# Patient Record
Sex: FEMALE | Race: WHITE | Employment: FULL TIME | URBAN - METROPOLITAN AREA
[De-identification: names, ages, dates, MRNs, and addresses within clinical notes are randomized per-mention and may not be internally consistent; named-entity substitution may affect disease eponyms.]

---

## 2020-11-16 ENCOUNTER — TRANSCRIBE ORDER (OUTPATIENT)
Dept: SCHEDULING | Age: 30
End: 2020-11-16

## 2020-11-16 DIAGNOSIS — S96.912D TEAR OF TENDON OF LEFT FOOT, SUBSEQUENT ENCOUNTER: Primary | ICD-10-CM

## 2020-11-27 ENCOUNTER — HOSPITAL ENCOUNTER (OUTPATIENT)
Dept: MRI IMAGING | Age: 30
Discharge: HOME | End: 2020-11-27
Attending: PODIATRIST | Admitting: PODIATRIST
Payer: COMMERCIAL

## 2020-11-27 DIAGNOSIS — S96.912D TEAR OF TENDON OF LEFT FOOT, SUBSEQUENT ENCOUNTER: ICD-10-CM

## 2020-11-27 PROCEDURE — 73718 MRI LOWER EXTREMITY W/O DYE: CPT

## 2020-12-17 NOTE — PERIOP NOTES
PATIENT REQUESTED TO DO COVID TESTING ON 12/30/2020  AT 0730 DUE TO VACATION, PT IS AWARE AND AGREED ON  QUARATINING AN EXTRA DAY. PATIENT INSTRUCTED ON SELF QUARANTINE BETWEEN TESTING AND ARRIVAL TIME DAY OF SURGERY.

## 2020-12-18 ENCOUNTER — TRANSCRIBE ORDER (OUTPATIENT)
Dept: REGISTRATION | Age: 30
End: 2020-12-18

## 2020-12-18 DIAGNOSIS — Z01.812 PRE-PROCEDURE LAB EXAM: Primary | ICD-10-CM

## 2021-01-08 ENCOUNTER — HOSPITAL ENCOUNTER (OUTPATIENT)
Dept: PREADMISSION TESTING | Age: 31
Discharge: HOME | End: 2021-01-08
Payer: COMMERCIAL

## 2021-01-08 ENCOUNTER — TRANSCRIBE ORDER (OUTPATIENT)
Dept: REGISTRATION | Age: 31
End: 2021-01-08

## 2021-01-08 DIAGNOSIS — Z01.812 PRE-PROCEDURE LAB EXAM: ICD-10-CM

## 2021-01-08 DIAGNOSIS — Z01.812 PRE-PROCEDURE LAB EXAM: Primary | ICD-10-CM

## 2021-01-08 PROCEDURE — 87635 SARS-COV-2 COVID-19 AMP PRB: CPT

## 2021-01-09 LAB — SARS-COV-2, COV2NT: NOT DETECTED

## 2021-01-11 ENCOUNTER — ANESTHESIA EVENT (OUTPATIENT)
Dept: SURGERY | Age: 31
End: 2021-01-11
Payer: COMMERCIAL

## 2021-01-12 ENCOUNTER — ANESTHESIA (OUTPATIENT)
Dept: SURGERY | Age: 31
End: 2021-01-12
Payer: COMMERCIAL

## 2021-01-12 ENCOUNTER — HOSPITAL ENCOUNTER (OUTPATIENT)
Age: 31
Setting detail: OUTPATIENT SURGERY
Discharge: HOME | End: 2021-01-12
Attending: PODIATRIST | Admitting: PODIATRIST
Payer: COMMERCIAL

## 2021-01-12 VITALS
WEIGHT: 149 LBS | DIASTOLIC BLOOD PRESSURE: 82 MMHG | HEART RATE: 93 BPM | RESPIRATION RATE: 8 BRPM | HEIGHT: 67 IN | BODY MASS INDEX: 23.39 KG/M2 | SYSTOLIC BLOOD PRESSURE: 123 MMHG | OXYGEN SATURATION: 95 % | TEMPERATURE: 97.6 F

## 2021-01-12 DIAGNOSIS — G89.18 POST-OP PAIN: Primary | ICD-10-CM

## 2021-01-12 LAB — HCG UR QL: NEGATIVE

## 2021-01-12 PROCEDURE — 88304 TISSUE EXAM BY PATHOLOGIST: CPT

## 2021-01-12 PROCEDURE — 74011000250 HC RX REV CODE- 250: Performed by: NURSE ANESTHETIST, CERTIFIED REGISTERED

## 2021-01-12 PROCEDURE — 74011250636 HC RX REV CODE- 250/636: Performed by: NURSE ANESTHETIST, CERTIFIED REGISTERED

## 2021-01-12 PROCEDURE — 77030036687 HC SHOE PSTOP S2SG -A

## 2021-01-12 PROCEDURE — 74011250636 HC RX REV CODE- 250/636: Performed by: PODIATRIST

## 2021-01-12 PROCEDURE — 76060000034 HC ANESTHESIA 1.5 TO 2 HR: Performed by: PODIATRIST

## 2021-01-12 PROCEDURE — 76210000016 HC OR PH I REC 1 TO 1.5 HR: Performed by: PODIATRIST

## 2021-01-12 PROCEDURE — 74011000250 HC RX REV CODE- 250: Performed by: PODIATRIST

## 2021-01-12 PROCEDURE — 77030002933 HC SUT MCRYL J&J -A: Performed by: PODIATRIST

## 2021-01-12 PROCEDURE — 77030031139 HC SUT VCRL2 J&J -A: Performed by: PODIATRIST

## 2021-01-12 PROCEDURE — 77030040922 HC BLNKT HYPOTHRM STRY -A

## 2021-01-12 PROCEDURE — 81025 URINE PREGNANCY TEST: CPT

## 2021-01-12 PROCEDURE — 74011250636 HC RX REV CODE- 250/636: Performed by: ANESTHESIOLOGY

## 2021-01-12 PROCEDURE — 77030010509 HC AIRWY LMA MSK TELE -A: Performed by: ANESTHESIOLOGY

## 2021-01-12 PROCEDURE — 74011250636 HC RX REV CODE- 250/636

## 2021-01-12 PROCEDURE — 2709999900 HC NON-CHARGEABLE SUPPLY: Performed by: PODIATRIST

## 2021-01-12 PROCEDURE — 76210000020 HC REC RM PH II FIRST 0.5 HR: Performed by: PODIATRIST

## 2021-01-12 PROCEDURE — 77030002916 HC SUT ETHLN J&J -A: Performed by: PODIATRIST

## 2021-01-12 PROCEDURE — 76010000149 HC OR TIME 1 TO 1.5 HR: Performed by: PODIATRIST

## 2021-01-12 PROCEDURE — 74011250637 HC RX REV CODE- 250/637: Performed by: ANESTHESIOLOGY

## 2021-01-12 RX ORDER — SODIUM CHLORIDE 0.9 % (FLUSH) 0.9 %
5-40 SYRINGE (ML) INJECTION EVERY 8 HOURS
Status: DISCONTINUED | OUTPATIENT
Start: 2021-01-12 | End: 2021-01-12 | Stop reason: HOSPADM

## 2021-01-12 RX ORDER — DEXTROSE, SODIUM CHLORIDE, SODIUM LACTATE, POTASSIUM CHLORIDE, AND CALCIUM CHLORIDE 5; .6; .31; .03; .02 G/100ML; G/100ML; G/100ML; G/100ML; G/100ML
125 INJECTION, SOLUTION INTRAVENOUS CONTINUOUS
Status: DISCONTINUED | OUTPATIENT
Start: 2021-01-12 | End: 2021-01-12 | Stop reason: HOSPADM

## 2021-01-12 RX ORDER — PROPOFOL 10 MG/ML
INJECTION, EMULSION INTRAVENOUS AS NEEDED
Status: DISCONTINUED | OUTPATIENT
Start: 2021-01-12 | End: 2021-01-12 | Stop reason: HOSPADM

## 2021-01-12 RX ORDER — DEXAMETHASONE SODIUM PHOSPHATE 4 MG/ML
INJECTION, SOLUTION INTRA-ARTICULAR; INTRALESIONAL; INTRAMUSCULAR; INTRAVENOUS; SOFT TISSUE AS NEEDED
Status: DISCONTINUED | OUTPATIENT
Start: 2021-01-12 | End: 2021-01-12 | Stop reason: HOSPADM

## 2021-01-12 RX ORDER — MORPHINE SULFATE 10 MG/ML
2 INJECTION, SOLUTION INTRAMUSCULAR; INTRAVENOUS
Status: DISCONTINUED | OUTPATIENT
Start: 2021-01-12 | End: 2021-01-12 | Stop reason: HOSPADM

## 2021-01-12 RX ORDER — KETOROLAC TROMETHAMINE 30 MG/ML
INJECTION, SOLUTION INTRAMUSCULAR; INTRAVENOUS AS NEEDED
Status: DISCONTINUED | OUTPATIENT
Start: 2021-01-12 | End: 2021-01-12 | Stop reason: HOSPADM

## 2021-01-12 RX ORDER — DEXAMETHASONE SODIUM PHOSPHATE 4 MG/ML
4 INJECTION, SOLUTION INTRA-ARTICULAR; INTRALESIONAL; INTRAMUSCULAR; INTRAVENOUS; SOFT TISSUE ONCE
Status: COMPLETED | OUTPATIENT
Start: 2021-01-12 | End: 2021-01-12

## 2021-01-12 RX ORDER — SODIUM CHLORIDE, SODIUM LACTATE, POTASSIUM CHLORIDE, CALCIUM CHLORIDE 600; 310; 30; 20 MG/100ML; MG/100ML; MG/100ML; MG/100ML
125 INJECTION, SOLUTION INTRAVENOUS CONTINUOUS
Status: DISCONTINUED | OUTPATIENT
Start: 2021-01-12 | End: 2021-01-12 | Stop reason: HOSPADM

## 2021-01-12 RX ORDER — ONDANSETRON 2 MG/ML
INJECTION INTRAMUSCULAR; INTRAVENOUS
Status: COMPLETED
Start: 2021-01-12 | End: 2021-01-12

## 2021-01-12 RX ORDER — MIDAZOLAM HYDROCHLORIDE 1 MG/ML
1 INJECTION, SOLUTION INTRAMUSCULAR; INTRAVENOUS AS NEEDED
Status: DISCONTINUED | OUTPATIENT
Start: 2021-01-12 | End: 2021-01-12 | Stop reason: HOSPADM

## 2021-01-12 RX ORDER — FENTANYL CITRATE 50 UG/ML
50 INJECTION, SOLUTION INTRAMUSCULAR; INTRAVENOUS AS NEEDED
Status: DISCONTINUED | OUTPATIENT
Start: 2021-01-12 | End: 2021-01-12 | Stop reason: HOSPADM

## 2021-01-12 RX ORDER — ONDANSETRON 2 MG/ML
INJECTION INTRAMUSCULAR; INTRAVENOUS AS NEEDED
Status: DISCONTINUED | OUTPATIENT
Start: 2021-01-12 | End: 2021-01-12 | Stop reason: HOSPADM

## 2021-01-12 RX ORDER — FENTANYL CITRATE 50 UG/ML
INJECTION, SOLUTION INTRAMUSCULAR; INTRAVENOUS AS NEEDED
Status: DISCONTINUED | OUTPATIENT
Start: 2021-01-12 | End: 2021-01-12 | Stop reason: HOSPADM

## 2021-01-12 RX ORDER — EPHEDRINE SULFATE/0.9% NACL/PF 50 MG/5 ML
SYRINGE (ML) INTRAVENOUS AS NEEDED
Status: DISCONTINUED | OUTPATIENT
Start: 2021-01-12 | End: 2021-01-12 | Stop reason: HOSPADM

## 2021-01-12 RX ORDER — SODIUM CHLORIDE 0.9 % (FLUSH) 0.9 %
5-40 SYRINGE (ML) INJECTION AS NEEDED
Status: DISCONTINUED | OUTPATIENT
Start: 2021-01-12 | End: 2021-01-12 | Stop reason: HOSPADM

## 2021-01-12 RX ORDER — DEXMEDETOMIDINE HYDROCHLORIDE 100 UG/ML
INJECTION, SOLUTION INTRAVENOUS AS NEEDED
Status: DISCONTINUED | OUTPATIENT
Start: 2021-01-12 | End: 2021-01-12 | Stop reason: HOSPADM

## 2021-01-12 RX ORDER — PROMETHAZINE HYDROCHLORIDE 25 MG/1
25 TABLET ORAL
Qty: 25 TAB | Refills: 0 | Status: SHIPPED | OUTPATIENT
Start: 2021-01-12 | End: 2021-10-12

## 2021-01-12 RX ORDER — LIDOCAINE HYDROCHLORIDE 20 MG/ML
INJECTION, SOLUTION EPIDURAL; INFILTRATION; INTRACAUDAL; PERINEURAL AS NEEDED
Status: DISCONTINUED | OUTPATIENT
Start: 2021-01-12 | End: 2021-01-12 | Stop reason: HOSPADM

## 2021-01-12 RX ORDER — ACETAMINOPHEN 325 MG/1
650 TABLET ORAL ONCE
Status: COMPLETED | OUTPATIENT
Start: 2021-01-12 | End: 2021-01-12

## 2021-01-12 RX ORDER — OXYCODONE HYDROCHLORIDE 5 MG/1
5 TABLET ORAL AS NEEDED
Status: DISCONTINUED | OUTPATIENT
Start: 2021-01-12 | End: 2021-01-12 | Stop reason: HOSPADM

## 2021-01-12 RX ORDER — OXYCODONE AND ACETAMINOPHEN 7.5; 325 MG/1; MG/1
1 TABLET ORAL
Qty: 15 TAB | Refills: 0 | Status: SHIPPED | OUTPATIENT
Start: 2021-01-12 | End: 2021-01-15

## 2021-01-12 RX ORDER — DIPHENHYDRAMINE HYDROCHLORIDE 50 MG/ML
12.5 INJECTION, SOLUTION INTRAMUSCULAR; INTRAVENOUS AS NEEDED
Status: DISCONTINUED | OUTPATIENT
Start: 2021-01-12 | End: 2021-01-12 | Stop reason: HOSPADM

## 2021-01-12 RX ORDER — DEXAMETHASONE SODIUM PHOSPHATE 4 MG/ML
INJECTION, SOLUTION INTRA-ARTICULAR; INTRALESIONAL; INTRAMUSCULAR; INTRAVENOUS; SOFT TISSUE
Status: DISCONTINUED
Start: 2021-01-12 | End: 2021-01-12 | Stop reason: HOSPADM

## 2021-01-12 RX ORDER — MIDAZOLAM HYDROCHLORIDE 1 MG/ML
1 INJECTION, SOLUTION INTRAMUSCULAR; INTRAVENOUS
Status: DISCONTINUED | OUTPATIENT
Start: 2021-01-12 | End: 2021-01-12 | Stop reason: HOSPADM

## 2021-01-12 RX ORDER — FENTANYL CITRATE 50 UG/ML
25 INJECTION, SOLUTION INTRAMUSCULAR; INTRAVENOUS
Status: DISCONTINUED | OUTPATIENT
Start: 2021-01-12 | End: 2021-01-12 | Stop reason: HOSPADM

## 2021-01-12 RX ORDER — MIDAZOLAM HYDROCHLORIDE 1 MG/ML
INJECTION, SOLUTION INTRAMUSCULAR; INTRAVENOUS AS NEEDED
Status: DISCONTINUED | OUTPATIENT
Start: 2021-01-12 | End: 2021-01-12 | Stop reason: HOSPADM

## 2021-01-12 RX ORDER — BUPIVACAINE HYDROCHLORIDE 5 MG/ML
INJECTION, SOLUTION EPIDURAL; INTRACAUDAL AS NEEDED
Status: DISCONTINUED | OUTPATIENT
Start: 2021-01-12 | End: 2021-01-12 | Stop reason: HOSPADM

## 2021-01-12 RX ORDER — ONDANSETRON 2 MG/ML
4 INJECTION INTRAMUSCULAR; INTRAVENOUS AS NEEDED
Status: DISCONTINUED | OUTPATIENT
Start: 2021-01-12 | End: 2021-01-12 | Stop reason: HOSPADM

## 2021-01-12 RX ORDER — LIDOCAINE HYDROCHLORIDE 10 MG/ML
0.5 INJECTION, SOLUTION EPIDURAL; INFILTRATION; INTRACAUDAL; PERINEURAL AS NEEDED
Status: DISCONTINUED | OUTPATIENT
Start: 2021-01-12 | End: 2021-01-12 | Stop reason: HOSPADM

## 2021-01-12 RX ADMIN — SODIUM CHLORIDE, POTASSIUM CHLORIDE, SODIUM LACTATE AND CALCIUM CHLORIDE 125 ML/HR: 600; 310; 30; 20 INJECTION, SOLUTION INTRAVENOUS at 10:59

## 2021-01-12 RX ADMIN — DEXMEDETOMIDINE HYDROCHLORIDE 10 MCG: 100 INJECTION, SOLUTION, CONCENTRATE INTRAVENOUS at 14:52

## 2021-01-12 RX ADMIN — Medication 10 MG: at 15:20

## 2021-01-12 RX ADMIN — PROPOFOL 170 MG: 10 INJECTION, EMULSION INTRAVENOUS at 14:47

## 2021-01-12 RX ADMIN — MIDAZOLAM 2 MG: 1 INJECTION INTRAMUSCULAR; INTRAVENOUS at 14:38

## 2021-01-12 RX ADMIN — SODIUM CHLORIDE, POTASSIUM CHLORIDE, SODIUM LACTATE AND CALCIUM CHLORIDE: 600; 310; 30; 20 INJECTION, SOLUTION INTRAVENOUS at 15:45

## 2021-01-12 RX ADMIN — LIDOCAINE HYDROCHLORIDE 50 MG: 20 INJECTION, SOLUTION EPIDURAL; INFILTRATION; INTRACAUDAL; PERINEURAL at 14:47

## 2021-01-12 RX ADMIN — DEXMEDETOMIDINE HYDROCHLORIDE 10 MCG: 100 INJECTION, SOLUTION, CONCENTRATE INTRAVENOUS at 16:01

## 2021-01-12 RX ADMIN — KETOROLAC TROMETHAMINE 30 MG: 30 INJECTION, SOLUTION INTRAMUSCULAR; INTRAVENOUS at 15:51

## 2021-01-12 RX ADMIN — ACETAMINOPHEN 650 MG: 325 TABLET ORAL at 10:50

## 2021-01-12 RX ADMIN — PROPOFOL 30 MG: 10 INJECTION, EMULSION INTRAVENOUS at 14:37

## 2021-01-12 RX ADMIN — WATER 2 G: 1 INJECTION INTRAMUSCULAR; INTRAVENOUS; SUBCUTANEOUS at 14:57

## 2021-01-12 RX ADMIN — DEXAMETHASONE SODIUM PHOSPHATE 8 MG: 4 INJECTION, SOLUTION INTRAMUSCULAR; INTRAVENOUS at 15:15

## 2021-01-12 RX ADMIN — ONDANSETRON 4 MG: 2 INJECTION INTRAMUSCULAR; INTRAVENOUS at 16:28

## 2021-01-12 RX ADMIN — FENTANYL CITRATE 100 MCG: 50 INJECTION, SOLUTION INTRAMUSCULAR; INTRAVENOUS at 14:47

## 2021-01-12 RX ADMIN — DEXAMETHASONE SODIUM PHOSPHATE 4 MG: 4 INJECTION, SOLUTION INTRAMUSCULAR; INTRAVENOUS at 16:47

## 2021-01-12 RX ADMIN — ONDANSETRON HYDROCHLORIDE 4 MG: 2 INJECTION, SOLUTION INTRAMUSCULAR; INTRAVENOUS at 15:29

## 2021-01-12 NOTE — BRIEF OP NOTE
Brief Postoperative Note    Patient: Sandee Curran  YOB: 1990  MRN: [id number]    Date of Procedure: 1/12/2021     Pre-Op Diagnosis: NEUROMA INTERSPACE LEFT FOOT    Post-Op Diagnosis: Cyst interspace 1 left foot    Procedure(s):  Remove cyst INTERSPACE 1 LEFT FOOT    Surgeon(s):  INDIO Hutson DPM Betsy Degree, MD    Surgical Assistant: None    Anesthesia: MAC     Estimated Blood Loss (mL): Minimal    Complications: None    Specimens:   ID Type Source Tests Collected by Time Destination   1 : soft tissue cyst Fresh Foot, left  [facility] 1/12/2021 1533 Pathology        Implants: * No implants in log *    Drains: * No LDAs found *    Findings: cyst    Electronically Signed by Wilburn Severin, DPM on 1/12/2021 at 3:45 PM

## 2021-01-12 NOTE — ROUTINE PROCESS
Patient: Esther Mohamud MRN: [id number]  SSN: [id number] YOB: 1990  Age: 27 y.o. Sex: female Patient is status post Procedure(s): RELEASE/RESECT NERVE INTERSPACE 1 LEFT FOOT. Surgeon(s) and Role: * Leonard Jo DPM - Primary Praveen , MD - Resident - Assisting Joellen Hanson DPM 
 
Local/Dose/Irrigation:  18 cc 0.5% marcaine, 4 mg dexamethasone Peripheral IV 01/12/21 Anterior;Right Hand (Active) Site Assessment Clean, dry, & intact 01/12/21 1059 Phlebitis Assessment 0 01/12/21 1059 Infiltration Assessment 0 01/12/21 1059 Dressing Status Clean, dry, & intact 01/12/21 1059 Dressing Type Transparent;Tape 01/12/21 1059 Hub Color/Line Status Pink; Infusing 01/12/21 1059 Airway - Endotracheal Tube 01/12/21 (Active) Dressing/Packing:  Incision 01/12/21 Foot Left-Dressing/Treatment: Ace wrap;Dry dressing;Gauze dressing/dressing sponge;Xeroform; Other (Comment)(kerlix) (01/12/21 1551) Splint/Cast:  ] Other:  Voided immediately prior to bringing back to OR

## 2021-01-12 NOTE — ANESTHESIA POSTPROCEDURE EVALUATION
Procedure(s):  RELEASE/RESECT NERVE INTERSPACE 1 LEFT FOOT. general    Anesthesia Post Evaluation        Patient location during evaluation: PACU  Patient participation: complete - patient participated  Level of consciousness: awake and alert  Pain management: adequate  Airway patency: patent  Anesthetic complications: no  Cardiovascular status: acceptable  Respiratory status: acceptable  Hydration status: acceptable  Comments: I have seen and evaluated the patient and is ready for discharge. Sanya Soto MD    Post anesthesia nausea and vomiting:  none      INITIAL Post-op Vital signs:   Vitals Value Taken Time   BP     Temp     Pulse 126 01/12/21 1616   Resp 13 01/12/21 1616   SpO2 98 % 01/12/21 1616   Vitals shown include unvalidated device data.

## 2021-01-12 NOTE — DISCHARGE INSTRUCTIONS
INSTRUCTIONS FOLLOWING FOOT SURGERY    ACTIVITY:  · Elevate feet for 48 hours  · Use ice as directed by your doctor  · Use crutches / walker as directed by your doctor, and follow your doctors instructions as to your weight bearing status - you can bear weight    DIET:  · Clear liquids until no nausea or vomiting; then light diet for the first day  · An advance to regular diet on second day, unless your doctor orders otherwise. PAIN:  · Take pain medication as directed by your doctor. · Call your doctor if pain is not relieved by medication. · Do not take aspirin or blood thinners until directed by your doctor. DRESSING CARE: keep dressing clean and dry, do not remove       FOLLOW-UP PHONE CALLS:  · Calls will be made by nursing staff. · If you had any problems, call your doctor as needed. CALL YOUR DOCTOR IF:  · Excessive bleeding that does not stop after holding mild pressure over the area  · Temperature of 101° F or above  · Redness, excessive swelling or bruising, and/or green or yellow, smelly discharge from incision  · Loss of sensation -cold, white, or blue toes    AFTER ANESTHESIA :   · For the first 24 hours: do not drive, drink alcoholic beverages, or make important decisions. · Be aware of dizziness following anesthesia and while taking pain medication. DISCHARGE MEDICATIONS:         APPOINTMENT DATE/TIME: please call for an appointment    YOUR DOCTORS PHONE NUMBER: [phone]    Patients signature:  Date: 1/12/2021  Discharging Nurse:     ______________________________________________________________________    Anesthesia Discharge Instructions    After general anesthesia or intervenous sedation, for 24 hours or while taking prescription Narcotics:  · Limit your activities  · Do not drive or operate hazardous machinery  · If you have not urinated within 8 hours after discharge, please contact your surgeon on call.   · Do not make important personal or business decisions  · Do not drink alcoholic beverages    Report the following to your surgeon:  · Excessive pain, swelling, redness or odor of or around the surgical area  · Temperature over 100.5 degrees  · Nausea and vomiting lasting longer than 4 hours or if unable to take medication  · Any signs of decreased circulation or nerve impairment to extremity:  Change in color, persistent numbness, tingling, coldness or increased pain.   · Any questions

## 2021-01-12 NOTE — ADDENDUM NOTE
Addendum  created 01/12/21 1621 by Antionette Zheng CRNA    Intraprocedure Meds edited, Orders acknowledged in Narrator

## 2021-01-12 NOTE — PROGRESS NOTES
Assisted pt to the Bathroom; now back in bed resting quietly; called pt  to update him as pt surgery now delayed to 12:05.

## 2021-01-12 NOTE — PERIOP NOTES
Patient spouse Jadon Alvarenga called and updated on surgery progress via cell phone number [phone]

## 2021-01-12 NOTE — PERIOP NOTES
Dr Jennifer Soares has been out to see the patient and pt expected to go back to surgery in the next 20  Minutes; Spoke to pt  and updated him on progress.

## 2021-01-13 NOTE — OP NOTES
[address]   OPERATIVE REPORT    Name:  Bin Abudllahi  MR#:  [id number]  :  1990  ACCOUNT #:  [de-identified]  DATE OF SERVICE:  2021    PREOPERATIVE DIAGNOSIS:  Neuroma, interspace one, left foot. POSTOPERATIVE DIAGNOSIS:  Soft tissue cyst, interspace one, left foot. PROCEDURE PERFORMED:  Resection of cyst, interspace one, left foot. SURGEON:  Aris Nissen, DPM    ASSISTANT:  Sivakumar Mock - Fellow, and Reagan Jackson DPM, PGY-3    ANESTHESIA:  General.    COMPLICATIONS:  None. SPECIMENS REMOVED:  Pathology, cyst, interspace one. IMPLANTS:  None. ESTIMATED BLOOD LOSS:  Minimal.    PROCEDURE:  On 2021, I saw the patient in the preoperative area. We discussed the surgery, the risks, the complications, and alternatives. She continued to have pain in the first interspace with palpation, not in the second interspace and no longer on any of the metatarsal or lesser metatarsal as her pain is in the interspace. She was taken to the operating room and placed on the operating table in the supine position. After adequate induction of general anesthesia, the patient was prepped and draped. I placed a thigh tourniquet over the patient's left thigh and raised it to 300 mmHg. We made an incision over the first interspace and carried it down to the level of the interspace. Once we were through The subcutaneous layer, we found the cyst intact just below the dorsal interosseous muscle. The dorsal interosseous muscle was retracted. We found the cyst, we resected it and its borders, and sent that to Pathology. The cyst was greater than 2x2 cm The area was flushed. Superficial fascia was reapproximated with 4-0 Vicryl and the skin was reapproximated with 5-0 Monocryl. She was placed in a dry sterile dressing and left the operating room to the recovery room in stable condition.         INDIO [email or web address]  D:  2021 15:57  T: 01/12/2021 21:49  JOB #:  [id number]

## 2021-06-11 ENCOUNTER — TRANSCRIBE ORDER (OUTPATIENT)
Dept: SCHEDULING | Age: 31
End: 2021-06-11

## 2021-06-11 DIAGNOSIS — M71.30 BURSAL CYST: Primary | ICD-10-CM

## 2021-06-11 DIAGNOSIS — G57.60 MORTON NEUROMA: ICD-10-CM

## 2021-07-01 ENCOUNTER — HOSPITAL ENCOUNTER (OUTPATIENT)
Dept: MRI IMAGING | Age: 31
Discharge: HOME | End: 2021-07-01
Attending: PODIATRIST
Payer: COMMERCIAL

## 2021-07-01 DIAGNOSIS — M71.30 BURSAL CYST: ICD-10-CM

## 2021-07-01 DIAGNOSIS — G57.60 MORTON NEUROMA: ICD-10-CM

## 2021-07-01 PROCEDURE — 73718 MRI LOWER EXTREMITY W/O DYE: CPT

## 2021-08-17 ENCOUNTER — TRANSCRIBE ORDER (OUTPATIENT)
Dept: SCHEDULING | Age: 31
End: 2021-08-17

## 2021-08-17 DIAGNOSIS — G57.62 LESION OF LEFT PLANTAR NERVE: Primary | ICD-10-CM

## 2021-08-17 DIAGNOSIS — M77.8: ICD-10-CM

## 2021-08-17 DIAGNOSIS — M77.8 CAPSULITIS OF FOOT, LEFT: ICD-10-CM

## 2021-08-20 ENCOUNTER — HOSPITAL ENCOUNTER (OUTPATIENT)
Dept: ULTRASOUND IMAGING | Age: 31
Discharge: HOME | End: 2021-08-20
Attending: PODIATRIST
Payer: COMMERCIAL

## 2021-08-20 DIAGNOSIS — M77.8: ICD-10-CM

## 2021-08-20 DIAGNOSIS — G57.62 LESION OF LEFT PLANTAR NERVE: ICD-10-CM

## 2021-08-20 PROCEDURE — 76882 US LMTD JT/FCL EVL NVASC XTR: CPT

## 2021-10-01 NOTE — PERIOP NOTES
PT WILL HAVE COVID TESTING DONE LOCAL AND FAX. FAX NUMBER PROVIDED AND PT INSTRUCTED TO BRING A HARD COPY OF RESULTS ON DOS. PATIENT ADVISED TO SELF QUARANTINE BETWEEN TESTING AND ARRIVAL TIME DAY OF SURGERY.

## 2021-10-12 ENCOUNTER — HOSPITAL ENCOUNTER (OUTPATIENT)
Age: 31
Setting detail: OUTPATIENT SURGERY
Discharge: HOME | End: 2021-10-12
Attending: PODIATRIST | Admitting: PODIATRIST
Payer: COMMERCIAL

## 2021-10-12 ENCOUNTER — ANESTHESIA EVENT (OUTPATIENT)
Dept: SURGERY | Age: 31
End: 2021-10-12
Payer: COMMERCIAL

## 2021-10-12 ENCOUNTER — ANESTHESIA (OUTPATIENT)
Dept: SURGERY | Age: 31
End: 2021-10-12
Payer: COMMERCIAL

## 2021-10-12 VITALS
SYSTOLIC BLOOD PRESSURE: 113 MMHG | RESPIRATION RATE: 10 BRPM | BODY MASS INDEX: 24.33 KG/M2 | TEMPERATURE: 98.6 F | HEART RATE: 68 BPM | HEIGHT: 67 IN | DIASTOLIC BLOOD PRESSURE: 69 MMHG | WEIGHT: 155 LBS | OXYGEN SATURATION: 94 %

## 2021-10-12 DIAGNOSIS — G89.18 POST-OP PAIN: Primary | ICD-10-CM

## 2021-10-12 LAB — HCG UR QL: NEGATIVE

## 2021-10-12 PROCEDURE — C9356 TENOGLIDE TENDON PROT, CM2: HCPCS | Performed by: PODIATRIST

## 2021-10-12 PROCEDURE — 74011250637 HC RX REV CODE- 250/637: Performed by: STUDENT IN AN ORGANIZED HEALTH CARE EDUCATION/TRAINING PROGRAM

## 2021-10-12 PROCEDURE — 74011000250 HC RX REV CODE- 250: Performed by: PODIATRIST

## 2021-10-12 PROCEDURE — 2709999900 HC NON-CHARGEABLE SUPPLY: Performed by: PODIATRIST

## 2021-10-12 PROCEDURE — 74011000250 HC RX REV CODE- 250: Performed by: NURSE ANESTHETIST, CERTIFIED REGISTERED

## 2021-10-12 PROCEDURE — 74011250636 HC RX REV CODE- 250/636: Performed by: NURSE ANESTHETIST, CERTIFIED REGISTERED

## 2021-10-12 PROCEDURE — 77030042556 HC PNCL CAUT -B: Performed by: PODIATRIST

## 2021-10-12 PROCEDURE — 76060000038 HC ANESTHESIA 3.5 TO 4 HR: Performed by: PODIATRIST

## 2021-10-12 PROCEDURE — 74011250636 HC RX REV CODE- 250/636: Performed by: PODIATRIST

## 2021-10-12 PROCEDURE — 77030000032 HC CUF TRNQT ZIMM -B: Performed by: PODIATRIST

## 2021-10-12 PROCEDURE — 77030031139 HC SUT VCRL2 J&J -A: Performed by: PODIATRIST

## 2021-10-12 PROCEDURE — 76210000020 HC REC RM PH II FIRST 0.5 HR: Performed by: PODIATRIST

## 2021-10-12 PROCEDURE — 76210000017 HC OR PH I REC 1.5 TO 2 HR: Performed by: PODIATRIST

## 2021-10-12 PROCEDURE — 74011250637 HC RX REV CODE- 250/637: Performed by: NURSE ANESTHETIST, CERTIFIED REGISTERED

## 2021-10-12 PROCEDURE — 81025 URINE PREGNANCY TEST: CPT

## 2021-10-12 PROCEDURE — 77030008684 HC TU ET CUF COVD -B: Performed by: NURSE ANESTHETIST, CERTIFIED REGISTERED

## 2021-10-12 PROCEDURE — 76010000134 HC OR TIME 3.5 TO 4 HR: Performed by: PODIATRIST

## 2021-10-12 PROCEDURE — 77030026438 HC STYL ET INTUB CARD -A: Performed by: NURSE ANESTHETIST, CERTIFIED REGISTERED

## 2021-10-12 PROCEDURE — 74011250636 HC RX REV CODE- 250/636: Performed by: STUDENT IN AN ORGANIZED HEALTH CARE EDUCATION/TRAINING PROGRAM

## 2021-10-12 PROCEDURE — 77030002916 HC SUT ETHLN J&J -A: Performed by: PODIATRIST

## 2021-10-12 PROCEDURE — 77030002922 HC SUT FBRWRE ARTH -B: Performed by: PODIATRIST

## 2021-10-12 DEVICE — TENOGLIDE TENDON PROTECTOR SHEET 4 IN X 5 IN (10CM X 12.5CM)
Type: IMPLANTABLE DEVICE | Site: FOOT | Status: FUNCTIONAL
Brand: TENOGLIDE®

## 2021-10-12 RX ORDER — DEXAMETHASONE SODIUM PHOSPHATE 4 MG/ML
INJECTION, SOLUTION INTRA-ARTICULAR; INTRALESIONAL; INTRAMUSCULAR; INTRAVENOUS; SOFT TISSUE AS NEEDED
Status: DISCONTINUED | OUTPATIENT
Start: 2021-10-12 | End: 2021-10-12 | Stop reason: HOSPADM

## 2021-10-12 RX ORDER — MIDAZOLAM HYDROCHLORIDE 1 MG/ML
INJECTION, SOLUTION INTRAMUSCULAR; INTRAVENOUS AS NEEDED
Status: DISCONTINUED | OUTPATIENT
Start: 2021-10-12 | End: 2021-10-12 | Stop reason: HOSPADM

## 2021-10-12 RX ORDER — HYDROCODONE BITARTRATE AND ACETAMINOPHEN 5; 325 MG/1; MG/1
1 TABLET ORAL ONCE
Status: COMPLETED | OUTPATIENT
Start: 2021-10-12 | End: 2021-10-12

## 2021-10-12 RX ORDER — ONDANSETRON 2 MG/ML
INJECTION INTRAMUSCULAR; INTRAVENOUS AS NEEDED
Status: DISCONTINUED | OUTPATIENT
Start: 2021-10-12 | End: 2021-10-12 | Stop reason: HOSPADM

## 2021-10-12 RX ORDER — LIDOCAINE HYDROCHLORIDE 20 MG/ML
INJECTION, SOLUTION EPIDURAL; INFILTRATION; INTRACAUDAL; PERINEURAL AS NEEDED
Status: DISCONTINUED | OUTPATIENT
Start: 2021-10-12 | End: 2021-10-12 | Stop reason: HOSPADM

## 2021-10-12 RX ORDER — FENTANYL CITRATE 50 UG/ML
INJECTION, SOLUTION INTRAMUSCULAR; INTRAVENOUS AS NEEDED
Status: DISCONTINUED | OUTPATIENT
Start: 2021-10-12 | End: 2021-10-12 | Stop reason: HOSPADM

## 2021-10-12 RX ORDER — ONDANSETRON 2 MG/ML
4 INJECTION INTRAMUSCULAR; INTRAVENOUS ONCE
Status: COMPLETED | OUTPATIENT
Start: 2021-10-12 | End: 2021-10-12

## 2021-10-12 RX ORDER — PROPOFOL 10 MG/ML
INJECTION, EMULSION INTRAVENOUS AS NEEDED
Status: DISCONTINUED | OUTPATIENT
Start: 2021-10-12 | End: 2021-10-12 | Stop reason: HOSPADM

## 2021-10-12 RX ORDER — HYDROMORPHONE HYDROCHLORIDE 2 MG/ML
INJECTION, SOLUTION INTRAMUSCULAR; INTRAVENOUS; SUBCUTANEOUS AS NEEDED
Status: DISCONTINUED | OUTPATIENT
Start: 2021-10-12 | End: 2021-10-12 | Stop reason: HOSPADM

## 2021-10-12 RX ORDER — BUPIVACAINE HYDROCHLORIDE AND EPINEPHRINE 2.5; 5 MG/ML; UG/ML
INJECTION, SOLUTION EPIDURAL; INFILTRATION; INTRACAUDAL; PERINEURAL AS NEEDED
Status: DISCONTINUED | OUTPATIENT
Start: 2021-10-12 | End: 2021-10-12 | Stop reason: HOSPADM

## 2021-10-12 RX ORDER — PROMETHAZINE HYDROCHLORIDE 25 MG/1
25 TABLET ORAL
Qty: 25 TABLET | Refills: 0 | Status: SHIPPED | OUTPATIENT
Start: 2021-10-12

## 2021-10-12 RX ORDER — SODIUM CHLORIDE, SODIUM LACTATE, POTASSIUM CHLORIDE, CALCIUM CHLORIDE 600; 310; 30; 20 MG/100ML; MG/100ML; MG/100ML; MG/100ML
INJECTION, SOLUTION INTRAVENOUS
Status: DISCONTINUED | OUTPATIENT
Start: 2021-10-12 | End: 2021-10-12 | Stop reason: HOSPADM

## 2021-10-12 RX ORDER — DEXMEDETOMIDINE HYDROCHLORIDE 100 UG/ML
INJECTION, SOLUTION INTRAVENOUS AS NEEDED
Status: DISCONTINUED | OUTPATIENT
Start: 2021-10-12 | End: 2021-10-12 | Stop reason: HOSPADM

## 2021-10-12 RX ORDER — OXYCODONE AND ACETAMINOPHEN 5; 325 MG/1; MG/1
1 TABLET ORAL
Qty: 15 TABLET | Refills: 0 | Status: SHIPPED | OUTPATIENT
Start: 2021-10-12 | End: 2021-10-15

## 2021-10-12 RX ORDER — PROPOFOL 10 MG/ML
INJECTION, EMULSION INTRAVENOUS
Status: DISCONTINUED | OUTPATIENT
Start: 2021-10-12 | End: 2021-10-12 | Stop reason: HOSPADM

## 2021-10-12 RX ORDER — ROCURONIUM BROMIDE 10 MG/ML
INJECTION, SOLUTION INTRAVENOUS AS NEEDED
Status: DISCONTINUED | OUTPATIENT
Start: 2021-10-12 | End: 2021-10-12 | Stop reason: HOSPADM

## 2021-10-12 RX ORDER — KETOROLAC TROMETHAMINE 30 MG/ML
INJECTION, SOLUTION INTRAMUSCULAR; INTRAVENOUS AS NEEDED
Status: DISCONTINUED | OUTPATIENT
Start: 2021-10-12 | End: 2021-10-12 | Stop reason: HOSPADM

## 2021-10-12 RX ORDER — SUCCINYLCHOLINE CHLORIDE 20 MG/ML
INJECTION INTRAMUSCULAR; INTRAVENOUS AS NEEDED
Status: DISCONTINUED | OUTPATIENT
Start: 2021-10-12 | End: 2021-10-12 | Stop reason: HOSPADM

## 2021-10-12 RX ORDER — SCOLOPAMINE TRANSDERMAL SYSTEM 1 MG/1
PATCH, EXTENDED RELEASE TRANSDERMAL AS NEEDED
Status: DISCONTINUED | OUTPATIENT
Start: 2021-10-12 | End: 2021-10-12 | Stop reason: HOSPADM

## 2021-10-12 RX ADMIN — ROCURONIUM BROMIDE 10 MG: 10 SOLUTION INTRAVENOUS at 09:57

## 2021-10-12 RX ADMIN — HYDROCODONE BITARTRATE AND ACETAMINOPHEN 1 TABLET: 5; 325 TABLET ORAL at 14:58

## 2021-10-12 RX ADMIN — ONDANSETRON HYDROCHLORIDE 4 MG: 2 INJECTION, SOLUTION INTRAMUSCULAR; INTRAVENOUS at 12:16

## 2021-10-12 RX ADMIN — PROPOFOL 200 MG: 10 INJECTION, EMULSION INTRAVENOUS at 09:39

## 2021-10-12 RX ADMIN — PROPOFOL 100 MCG/KG/MIN: 10 INJECTION, EMULSION INTRAVENOUS at 09:48

## 2021-10-12 RX ADMIN — ROCURONIUM BROMIDE 10 MG: 10 SOLUTION INTRAVENOUS at 09:44

## 2021-10-12 RX ADMIN — DEXMEDETOMIDINE HYDROCHLORIDE 20 MCG: 100 INJECTION, SOLUTION, CONCENTRATE INTRAVENOUS at 09:51

## 2021-10-12 RX ADMIN — ONDANSETRON 4 MG: 2 INJECTION INTRAMUSCULAR; INTRAVENOUS at 14:57

## 2021-10-12 RX ADMIN — HYDROMORPHONE HYDROCHLORIDE 0.5 MG: 2 INJECTION, SOLUTION INTRAMUSCULAR; INTRAVENOUS; SUBCUTANEOUS at 12:43

## 2021-10-12 RX ADMIN — SCOPALAMINE 1 PATCH: 1 PATCH, EXTENDED RELEASE TRANSDERMAL at 10:20

## 2021-10-12 RX ADMIN — DEXAMETHASONE SODIUM PHOSPHATE 8 MG: 4 INJECTION, SOLUTION INTRAMUSCULAR; INTRAVENOUS at 09:51

## 2021-10-12 RX ADMIN — SODIUM CHLORIDE, POTASSIUM CHLORIDE, SODIUM LACTATE AND CALCIUM CHLORIDE: 600; 310; 30; 20 INJECTION, SOLUTION INTRAVENOUS at 09:35

## 2021-10-12 RX ADMIN — ROCURONIUM BROMIDE 10 MG: 10 SOLUTION INTRAVENOUS at 09:39

## 2021-10-12 RX ADMIN — KETOROLAC TROMETHAMINE 30 MG: 30 INJECTION, SOLUTION INTRAMUSCULAR; INTRAVENOUS at 12:16

## 2021-10-12 RX ADMIN — FENTANYL CITRATE 50 MCG: 50 INJECTION, SOLUTION INTRAMUSCULAR; INTRAVENOUS at 09:35

## 2021-10-12 RX ADMIN — MIDAZOLAM 4 MG: 1 INJECTION INTRAMUSCULAR; INTRAVENOUS at 09:35

## 2021-10-12 RX ADMIN — SUCCINYLCHOLINE CHLORIDE 140 MG: 20 INJECTION, SOLUTION INTRAMUSCULAR; INTRAVENOUS at 09:39

## 2021-10-12 RX ADMIN — HYDROMORPHONE HYDROCHLORIDE 0.5 MG: 2 INJECTION, SOLUTION INTRAMUSCULAR; INTRAVENOUS; SUBCUTANEOUS at 11:40

## 2021-10-12 RX ADMIN — DEXMEDETOMIDINE HYDROCHLORIDE 10 MCG: 100 INJECTION, SOLUTION, CONCENTRATE INTRAVENOUS at 11:22

## 2021-10-12 RX ADMIN — LIDOCAINE HYDROCHLORIDE 100 MG: 20 INJECTION, SOLUTION EPIDURAL; INFILTRATION; INTRACAUDAL; PERINEURAL at 09:39

## 2021-10-12 RX ADMIN — WATER 2 G: 1 INJECTION INTRAMUSCULAR; INTRAVENOUS; SUBCUTANEOUS at 09:55

## 2021-10-12 RX ADMIN — FENTANYL CITRATE 50 MCG: 50 INJECTION, SOLUTION INTRAMUSCULAR; INTRAVENOUS at 09:39

## 2021-10-12 NOTE — ROUTINE PROCESS
Patient: Giselle Simpson MRN: [id number]  SSN: [id number]   YOB: 1990  Age: 27 y.o. Sex: female     Patient is status post Procedure(s):  REPAIR OF FLEXOR DIGITORUM LONGUS TENDON TOE #3, REPAIR OF PLANTAR CAPSULE METATARSAL PHALANGEAL JOINT #3 LEFT FOOT. Surgeon(s) and Role:     * Nir Del Rosario DPM - Primary     * Bridgette Perea MD - Fellow    Local/Dose/Irrigation:  0.25% marcaine with epi 10ml injected as a block prior to prep                          Airway - Endotracheal Tube 10/12/21 Oral (Active)                   Dressing/Packing:  Incision 10/12/21 Foot Left-Dressing/Treatment: Xeroform;Gauze dressing/dressing sponge; Cast padding; Ace wrap;Roll gauze; Other (Comment) (splint) (10/12/21 4209)

## 2021-10-12 NOTE — DISCHARGE INSTRUCTIONS
INSTRUCTIONS FOLLOWING FOOT SURGERY    ACTIVITY:  · Elevate feet for 48 hours  · Use ice as directed by your doctor  · Use crutches / walker as directed by your doctor, and follow your doctors instructions as to your weight bearing status - NO WEIGHT ON THE LEFT FOOT     DIET:  · Clear liquids until no nausea or vomiting; then light diet for the first day  · An advance to regular diet on second day, unless your doctor orders otherwise. PAIN:  · Take pain medication as directed by your doctor. · Call your doctor if pain is not relieved by medication. · Do not take aspirin or blood thinners until directed by your doctor. DRESSING CARE: keep dressing clean and dry, do not remove       FOLLOW-UP PHONE CALLS:  · Calls will be made by nursing staff. · If you had any problems, call your doctor as needed. CALL YOUR DOCTOR IF:  · Excessive bleeding that does not stop after holding mild pressure over the area  · Temperature of 101° F or above  · Redness, excessive swelling or bruising, and/or green or yellow, smelly discharge from incision  · Loss of sensation -cold, white, or blue toes    AFTER ANESTHESIA :   · For the first 24 hours: do not drive, drink alcoholic beverages, or make important decisions. · Be aware of dizziness following anesthesia and while taking pain medication. DISCHARGE MEDICATIONS:         APPOINTMENT DATE/TIME: please call for an appointment    YOUR DOCTORS PHONE NUMBER: [phone]    Patients signature:  Date: 10/12/2021  Discharging Nurse:     ______________________________________________________________________    Anesthesia Discharge Instructions    After general anesthesia or intervenous sedation, for 24 hours or while taking prescription Narcotics:  · Limit your activities  · Do not drive or operate hazardous machinery  · If you have not urinated within 8 hours after discharge, please contact your surgeon on call.   · Do not make important personal or business decisions  · Do not drink alcoholic beverages    Report the following to your surgeon:  · Excessive pain, swelling, redness or odor of or around the surgical area  · Temperature over 100.5 degrees  · Nausea and vomiting lasting longer than 4 hours or if unable to take medication  · Any signs of decreased circulation or nerve impairment to extremity:  Change in color, persistent numbness, tingling, coldness or increased pain.   · Any questions

## 2021-10-12 NOTE — BRIEF OP NOTE
Brief Postoperative Note    Patient: Jelena Padilla  YOB: 1990  MRN: [id number]    Date of Procedure: 10/12/2021     Pre-Op Diagnosis: Flexor Digitorium Longus Tendon tear toe 3 left foot    Post-Op Diagnosis: Same as preoperative diagnosis. Procedure(s):  REPAIR OF FLEXOR DIGITORUM LONGUS TENDON TOE #3, LEFT FOOT    Surgeon(s):  INDIO Eason MD    Surgical Assistant: None    Anesthesia: General     Estimated Blood Loss (mL): less than 50     Complications: None    Specimens: * No specimens in log *     Implants:   Implant Name Type Inv.  Item Serial No.  Lot No. LRB No. Used Action   PROTECTOR TEND [id number] SHT BOV CLLGN GLYCOSAMINOGLYCAN POR - SNA Other PROTECTOR TEND [id number] SHT BOV CLLGN GLYCOSAMINOGLYCAN POR NA [employer] [id number] Left 1 Implanted       Drains: * No LDAs found *    Findings: linear tear of the FDL     Electronically Signed by Sanjay Amos DPM on 10/12/2021 at 12:30 PM

## 2021-10-13 NOTE — ANESTHESIA POSTPROCEDURE EVALUATION
Post-Anesthesia Evaluation and Assessment    Patient: Unruly Luis MRN: [id number]  SSN: [id number]    YOB: 1990  Age: 27 y.o. Sex: female      I have evaluated the patient and they are stable and ready for discharge from the PACU. Cardiovascular Function/Vital Signs  Visit Vitals  /69   Pulse 68   Temp 37 °C (98.6 °F)   Resp 10   Ht 5' 7\" (1.702 m)   Wt 70.3 kg (155 lb)   SpO2 94%   BMI 24.28 kg/m²       Patient is status post General anesthesia for Procedure(s):  REPAIR OF FLEXOR DIGITORUM LONGUS TENDON TOE #3, REPAIR OF PLANTAR CAPSULE METATARSAL PHALANGEAL JOINT #3 LEFT FOOT WITH TENOGLIDE APPLICATION. Nausea/Vomiting: None    Postoperative hydration reviewed and adequate. Pain:  Pain Scale 1: Numeric (0 - 10) (10/12/21 1500)  Pain Intensity 1: 2 (10/12/21 1500)   Managed    Neurological Status:   Neuro (WDL): Within Defined Limits (10/12/21 1400)  Neuro  Neurologic State: Appropriate for age;Eyes open spontaneously; Eyes open to voice (10/12/21 1400)  Orientation Level: Oriented X4 (10/12/21 1400)  LUE Motor Response: Purposeful (10/12/21 1400)  LLE Motor Response: Other(comment);Numbness (s/p LLE sx) (10/12/21 1400)  RUE Motor Response: Purposeful (10/12/21 1400)  RLE Motor Response: Purposeful (10/12/21 1400)   At baseline    Mental Status, Level of Consciousness: Alert and  oriented to person, place, and time    Pulmonary Status:   O2 Device: None (Room air) (10/12/21 1400)   Adequate oxygenation and airway patent    Complications related to anesthesia: None    Post-anesthesia assessment completed.  No concerns    Signed By: Neeta Gutierrez DO     October 13, 2021

## 2021-10-13 NOTE — OP NOTES
Date of Procedure: 10/12/2021      Pre-Op Diagnosis: Flexor Digitorium Longus Tendon tear toe 3 left foot     Post-Op Diagnosis: Same as preoperative diagnosis.       Procedure(s):  REPAIR OF FLEXOR DIGITORUM LONGUS TENDON TOE #3, LEFT FOOT     Surgeon(s):  INDIO Thomas MD     Surgical Assistant: None     Anesthesia: General      Estimated Blood Loss (mL): less than 50      Complications: None     Specimens: * No specimens in log *      Implants:   Implant Name Type Inv. Item Serial No.  Lot No. LRB No. Used Action   PROTECTOR TEND [id number] SHT BOV CLLGN GLYCOSAMINOGLYCAN POR - SNA Other PROTECTOR TEND [id number] SHT BOV CLLGN GLYCOSAMINOGLYCAN POR NA [employer] [id number] Left 1 Implanted         Drains: * No LDAs found *     Findings: linear tear of the FDL     This is a 26-year-old female who has a previous history of forefoot surgery to the left second toe. She was found to have continued and unrelenting pain submet head 3 with pain exacerbated by passive dorsiflexion of left toe 3 and active plantar flexion of same. MRI imaging was significant for a tear of the plantar capsule of the left third MTPJ possible tear of the long flexor tendon. Was determined that she would benefit from surgical invention. She was consented in the preoperative area. All questions were answered. No guarantees were given. Patient was brought into the operating room placed on the operating table in the supine position. After induction, surgical timeout ensued according to standard protocol. Local anesthesia was infiltrated with 10 cc of 0.2 0.5% Marcaine with epinephrine. Patient's operative foot was then scrubbed, prepped, and draped in the usual aseptic manner. Attention was directed to the operative foot where an incision was made on the plantar foot extending distally from the base of the third toe proximally to the midfoot area.   We deepened our dissection with blunt instrumentation. We were immediately impressed by a superficial nerve that was crossing somewhat obliquely through the surgical site. It appeared to potentially be one of the common digital nerves that was caught up in some scar tissue. We used blunt instrumentation to free up the nerve and free it from all adhesions and scar tissue. We carefully looped it with Vessel loops and gently retracted laterally. We continued our dissection with blunt instrumentation down to the level of the sheath of the flexor digitorum longus tendon to the third toe. We were immediately impressed by a linear tear of the tendon itself. We used a #15 blade to freshen up the stair and we repaired it in a tubularized fashion with 4-0 Vicryl. We then wrapped it in Tenoglide. We then retracted this to the side and noted a small tear of the plantar capsule of the fourth MTPJ. We were able to somewhat flat this back into place and repair with 3-0 Vicryl. We flushed the site with copious amount of sterile saline. We closed the fascia with 3-0 Vicryl, the subcutaneous tissues with 4-0 Vicryl and the skin with 3-0 nylon in a mattress fashion. We injected the site with 1cc of Dexamethasone. We applied a dry sterile dressing and a very copiously padded posterior splint with the foot in plantarflexion to relax the flexor tendons. The patient was transferred to the stretcher and transported to the recovery area with vital signs stable neurovascular status intact to the operative lower extremity. After period of postoperative monitoring, she will be discharged home.

## 2023-05-14 RX ORDER — PROMETHAZINE HYDROCHLORIDE 25 MG/1
25 TABLET ORAL EVERY 6 HOURS PRN
COMMUNITY
Start: 2021-10-12

## (undated) DEVICE — BANDAGE,GAUZE,CONFORMING,4"X75",STRL,LF: Brand: MEDLINE

## (undated) DEVICE — DRSG GZ OIL EMUL CURAD 3X3 --

## (undated) DEVICE — SURGICAL PROCEDURE PACK BASIN MAJ SET CUST NO CAUT

## (undated) DEVICE — INTENDED FOR TISSUE SEPARATION, AND OTHER PROCEDURES THAT REQUIRE A SHARP SURGICAL BLADE TO PUNCTURE OR CUT.: Brand: BARD-PARKER ® CARBON RIB-BACK BLADES

## (undated) DEVICE — SUTURE ETHLN SZ 4-0 L18IN NONABSORBABLE BLK L19MM PS-2 3/8 1667H

## (undated) DEVICE — DRAPE,REIN 53X77,STERILE: Brand: MEDLINE

## (undated) DEVICE — BANDAGE,GAUZE,BULKEE II,4.5"X4.1YD,STRL: Brand: MEDLINE

## (undated) DEVICE — EXTREMITY - SMH: Brand: MEDLINE INDUSTRIES, INC.

## (undated) DEVICE — TUBING SUCT 12FR MAL ALUM SHFT FN CAP VENT UNIV CONN W/ OBT

## (undated) DEVICE — STERILE POLYISOPRENE POWDER-FREE SURGICAL GLOVES WITH EMOLLIENT COATING: Brand: PROTEXIS

## (undated) DEVICE — PADDING CST CRMPD 3INX4YD NS --

## (undated) DEVICE — ZIMMER® STERILE DISPOSABLE TOURNIQUET CUFF WITH PLC, DUAL PORT, SINGLE BLADDER, 34 IN. (86 CM)

## (undated) DEVICE — SYR 10ML LUER LOK 1/5ML GRAD --

## (undated) DEVICE — NEEDLE HYPO 25GA L1.5IN BVL ORIENTED ECLIPSE

## (undated) DEVICE — CONTAINER,SPEC,PNEUM TUBE,3OZ,STRL PATH: Brand: MEDLINE

## (undated) DEVICE — TOWEL SURG W17XL27IN STD BLU COT NONFENESTRATED PREWASHED

## (undated) DEVICE — SUTURE VCRL SZ 3-0 L27IN ABSRB UD L26MM SH 1/2 CIR J416H

## (undated) DEVICE — SUTURE VCRL SZ 3-0 L27IN ABSRB UD L17MM RB-1 1/2 CIR J215H

## (undated) DEVICE — COVER LT HNDL PLAS RIG 1 PER PK

## (undated) DEVICE — DRESSING PETRO W3XL8IN N ADH OIL EMUL GZ CURAD

## (undated) DEVICE — GLOVE ORANGE PI 7 1/2   MSG9075

## (undated) DEVICE — DRESSING,GAUZE,XEROFORM,CURAD,1"X8",ST: Brand: CURAD

## (undated) DEVICE — DRAPE,EXTREMITY,89X128,STERILE: Brand: MEDLINE

## (undated) DEVICE — BNDG ELAS HK LOOP 4X5YD NS -- MATRIX

## (undated) DEVICE — BANDAGE,GAUZE,CONFORMING,3"X75",STRL,LF: Brand: MEDLINE

## (undated) DEVICE — SUTURE FIBERWIRE SZ 2 W/ TAPERED NEEDLE BLUE L38IN NONABSORB BLU L26.5MM 1/2 CIRCLE AR7200

## (undated) DEVICE — SPONGE GZ W4XL4IN COT 12 PLY TYP VII WVN C FLD DSGN

## (undated) DEVICE — INFECTION CONTROL KIT SYS

## (undated) DEVICE — SUTURE VCRL SZ 3-0 L54IN ABSRB VLT LIGAPAK REEL NDL J205G

## (undated) DEVICE — SOLUTION SURG PREP 26 CC PURPREP

## (undated) DEVICE — PAD,ABDOMINAL,5"X9",ST,LF,25/BX: Brand: MEDLINE INDUSTRIES, INC.

## (undated) DEVICE — SOLUTION IV 1000ML 0.9% SOD CHL

## (undated) DEVICE — SUTURE MCRYL SZ 5-0 L18IN ABSRB UD PC-1 L13MM 3/8 CIR Y834G

## (undated) DEVICE — SPONGE GZ W4XL4IN COT RADPQ HIGHLY ABSRB

## (undated) DEVICE — LOOP,VESSEL,MAXI,BLUE,2/PK,STERILE: Brand: MEDLINE

## (undated) DEVICE — PACK,BASIC,SIRUS,V: Brand: MEDLINE

## (undated) DEVICE — PENCIL SMK EVAC 10 FT BLADE ELECTRD ROCKER FOR TELSCP

## (undated) DEVICE — PADDING CST 4INX4YD --

## (undated) DEVICE — Device: Brand: MEDEX

## (undated) DEVICE — SUTURE VCRL SZ 2-0 L27IN ABSRB UD L26MM SH 1/2 CIR J417H

## (undated) DEVICE — SUTURE VCRL SZ 4-0 L27IN ABSRB UD L26MM SH 1/2 CIR J415H

## (undated) DEVICE — STRAP,POSITIONING,KNEE/BODY,FOAM,4X60": Brand: MEDLINE

## (undated) DEVICE — BNDG ELAS HK LOOP 6X5YD NS -- MATRIX

## (undated) DEVICE — SUTURE VCRL SZ 4-0 L54IN ABSRB VLT LIGAPAK REEL NDL J204G